# Patient Record
Sex: FEMALE | URBAN - METROPOLITAN AREA
[De-identification: names, ages, dates, MRNs, and addresses within clinical notes are randomized per-mention and may not be internally consistent; named-entity substitution may affect disease eponyms.]

---

## 2023-07-29 ENCOUNTER — VIRTUAL VISIT (OUTPATIENT)
Dept: URGENT CARE | Facility: CLINIC | Age: 68
End: 2023-07-29
Payer: MEDICARE

## 2023-07-29 DIAGNOSIS — L03.115 CELLULITIS OF RIGHT LOWER LEG: Primary | ICD-10-CM

## 2023-07-29 DIAGNOSIS — S80.261A INFECTED INSECT BITE OF RIGHT KNEE, INITIAL ENCOUNTER: ICD-10-CM

## 2023-07-29 DIAGNOSIS — L08.9 INFECTED INSECT BITE OF RIGHT KNEE, INITIAL ENCOUNTER: ICD-10-CM
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
## 2023-07-29 PROCEDURE — 99203 OFFICE O/P NEW LOW 30 MIN: CPT | Mod: VID

## 2023-07-29 NOTE — PROGRESS NOTES
Lourdes is a 68 year old female who presents for a billable video visit.    ASSESSMENT/PLAN:    1. Cellulitis of right lower leg    - amoxicillin-clavulanate (AUGMENTIN) 875-125 MG tablet; Take 1 tablet by mouth 2 times daily for 10 days  Dispense: 20 tablet; Refill: 0    2. Infected insect bite of right knee, initial encounter   amoxicillin-clavulanate (AUGMENTIN) 875-125 MG tablet; Take 1 tablet by mouth 2 times daily for 10 days  Dispense: 20 tablet; Refill: 0    Patient advised to isabella the edges of the area of redness with a sharpie or skin marker. She is advised to seek care in the Emergency room if the area of redness expands.    SUBJECTIVE:    Patient reports bug bite on the right posterior knee which happened 2 weeks ago. She notes the bite wound is itchy and painful. She notes redness on the posterior knee extending to the calf. Patient is visiting family from [place]. She denies fever, chills. body aches or sweats.    ROS: Pertinent ROS neg other than the symptoms noted above in the HPI.     OBJECTIVE:  Vitals not done due to this being a virtual visit    GENERAL: healthy, alert and no distress  EYES: Eyes grossly normal to inspection,conjunctivae and sclerae normal  RESP: Able to speak in complete sentences, no audible wheeze or cough  SKIN: Bite wound on the right popliteal fossa, appears infected. There is surrounding erythema from the posterior knee extending to the calf and lateral leg.  NEURO: mentation intact and speech normal  PSYCH: mentation appears normal, affect normal/bright    Video-Visit Details    Type of service:  Video Visit  Video Start Time: 12:58 pm  Video End Time: 1:08 pm    Originating Location: Home    Distant Location:  [facility]     Platform used for Video Visit: Blake Abel PA-C